# Patient Record
Sex: MALE
[De-identification: names, ages, dates, MRNs, and addresses within clinical notes are randomized per-mention and may not be internally consistent; named-entity substitution may affect disease eponyms.]

---

## 2018-06-28 ENCOUNTER — APPOINTMENT (OUTPATIENT)
Dept: ORTHOPEDIC SURGERY | Facility: CLINIC | Age: 43
End: 2018-06-28
Payer: COMMERCIAL

## 2018-06-28 VITALS — HEIGHT: 69 IN | BODY MASS INDEX: 23.4 KG/M2 | RESPIRATION RATE: 16 BRPM | WEIGHT: 158 LBS

## 2018-06-28 DIAGNOSIS — M79.645 PAIN IN LEFT FINGER(S): ICD-10-CM

## 2018-06-28 DIAGNOSIS — Z78.9 OTHER SPECIFIED HEALTH STATUS: ICD-10-CM

## 2018-06-28 PROBLEM — Z00.00 ENCOUNTER FOR PREVENTIVE HEALTH EXAMINATION: Status: ACTIVE | Noted: 2018-06-28

## 2018-06-28 PROCEDURE — 73140 X-RAY EXAM OF FINGER(S): CPT | Mod: F4

## 2018-06-28 PROCEDURE — 99203 OFFICE O/P NEW LOW 30 MIN: CPT

## 2018-06-28 RX ORDER — DEXTROAMPHETAMINE SACCHARATE, AMPHETAMINE ASPARTATE, DEXTROAMPHETAMINE SULFATE AND AMPHETAMINE SULFATE 5; 5; 5; 5 MG/1; MG/1; MG/1; MG/1
20 TABLET ORAL
Qty: 30 | Refills: 0 | Status: ACTIVE | COMMUNITY
Start: 2018-04-09

## 2018-06-28 RX ORDER — METHYLPREDNISOLONE 4 MG/1
4 TABLET ORAL
Qty: 21 | Refills: 0 | Status: ACTIVE | COMMUNITY
Start: 2018-02-07

## 2018-06-28 RX ORDER — SELENIUM SULFIDE 25 MG/ML
2.5 LOTION TOPICAL
Qty: 120 | Refills: 0 | Status: ACTIVE | COMMUNITY
Start: 2017-12-18

## 2018-06-28 RX ORDER — AVANAFIL 100 MG/1
100 TABLET ORAL
Qty: 6 | Refills: 0 | Status: ACTIVE | COMMUNITY
Start: 2018-05-31

## 2018-06-28 RX ORDER — FINASTERIDE 1 MG/1
1 TABLET ORAL
Qty: 30 | Refills: 0 | Status: ACTIVE | COMMUNITY
Start: 2017-05-24

## 2020-04-29 ENCOUNTER — APPOINTMENT (OUTPATIENT)
Dept: UROLOGY | Facility: CLINIC | Age: 45
End: 2020-04-29
Payer: COMMERCIAL

## 2020-04-29 DIAGNOSIS — N46.9 MALE INFERTILITY, UNSPECIFIED: ICD-10-CM

## 2020-04-29 PROCEDURE — 99214 OFFICE O/P EST MOD 30 MIN: CPT | Mod: 95

## 2020-04-29 RX ORDER — SILDENAFIL 100 MG/1
100 TABLET, FILM COATED ORAL
Qty: 90 | Refills: 1 | Status: ACTIVE | COMMUNITY
Start: 2020-04-29 | End: 1900-01-01

## 2020-04-29 NOTE — LETTER BODY
[Dear  ___] : Dear  [unfilled], [Please see my note below.] : Please see my note below. [FreeTextEntry3] : Best Regards, \par \par Aracelis Mustafa MD\par  [FreeTextEntry1] : I had the pleasure of evaluation of your patient today via a telehealth virtual visit.\par  [Consult Closing:] : Thank you very much for allowing me to participate in the care of this patient.  If you have any questions, please do not hesitate to contact me.

## 2020-04-29 NOTE — HISTORY OF PRESENT ILLNESS
[Home] : at home, [unfilled] , at the time of the visit. [Other Location: e.g. Home (Enter Location, City,State)___] : at [unfilled] [Patient] : the patient [FreeTextEntry1] :  The patient-doctor relationship has been established in a face to face fashion via real time video/audio HIPAA compliant communication using telemedicine software. The patient's identity has been confirmed. The patient was previously emailed a copy of the telemedicine consent. They have had a chance to review and has now given verbal consent and has requested care to be assessed and treated through telemedicine and understands there maybe limitations in this process and they may need further follow up care in the office and or hospital settings. \par Verbal consent given on 4/29/2020, at 10:30 AM, crystal [facility].\par \par This 45 year old male was last seen in 12/12/2018 to follow up on penile trauma and to check for subsequent Peyronie's Disease. He was treated with Vitamine E and subsequently noted improvement in the angle and the fibrosis. At present, he feels that there may be a slight curvature and an indentation at the site of the prior fibrosis on the left side of the shaft with a full erection, but he does not feel a nodule, and does not have pain. The patient can get an erection with Viagra 100 mg (1/4), which he has been taking for ~10 years. He would like switch from his PCP to me as the ordering physician for the sildenafil and get it from [facility], when I offered this.\par \par The patient also tells me that he and his fiance are considering trying to get pregnant over the next 2-3 months. He is on finasteride 1 mg daily for hair and has been for many years. He is concerned over the potential effect that this may have on his fertility potential. He also takes occasional adderal to focus at work.\par \par  The patient denies fevers, chills, nausea and or vomiting and no unexplained weight loss. \par All pertinent parts of the patient PFSH (past medical, family and social histories), laboratory, radiological studies and physician notes were reviewed prior to starting the face to face portion of the telemedicine visit. Questionnaire results were discussed with patient.\par \par \par

## 2020-04-29 NOTE — ASSESSMENT
[FreeTextEntry1] : We had an extensive discussion about the penile symptoms and likelihood that these represent mild, stable P.D. We also discussed the potential that these could increase and the need to be sure that both he and his fiance are well lubricated during sexual activity. We discussed optimal lubricants to maximize comfort and minimize chaffing. I ordered sildenafil 100 mg x 90 x 2 at [facility].\par \par As for the fertility issues, we discussed the potential that finasteride could deleteriously affect his sperm production and/or function. We discussed the need to stop for 32 months to see any beneficial effect, if he choses to stop the medication. We also discussed the option to try while on the medication for 3 months and, if not successful, then obtain a semen analysis. We also discussed getting a SA now, but we will not due to CO-V ID 19 issues. We also discussed limiting EtOH intake to no more than 1-2 drinks / week, and staying away from THC or other recreational drugs. We will plan to meet for an exam at some point in the future. We ended the visual portion of the visit at 10:52 AM. Aracelis Mustafa MD\par

## 2020-12-07 ENCOUNTER — TRANSCRIPTION ENCOUNTER (OUTPATIENT)
Age: 45
End: 2020-12-07

## 2021-04-05 ENCOUNTER — TRANSCRIPTION ENCOUNTER (OUTPATIENT)
Age: 46
End: 2021-04-05

## 2021-10-29 ENCOUNTER — TRANSCRIPTION ENCOUNTER (OUTPATIENT)
Age: 46
End: 2021-10-29

## 2021-11-30 ENCOUNTER — TRANSCRIPTION ENCOUNTER (OUTPATIENT)
Age: 46
End: 2021-11-30

## 2021-12-04 ENCOUNTER — TRANSCRIPTION ENCOUNTER (OUTPATIENT)
Age: 46
End: 2021-12-04

## 2022-01-10 ENCOUNTER — TRANSCRIPTION ENCOUNTER (OUTPATIENT)
Age: 47
End: 2022-01-10

## 2022-01-15 ENCOUNTER — TRANSCRIPTION ENCOUNTER (OUTPATIENT)
Age: 47
End: 2022-01-15

## 2022-02-17 ENCOUNTER — TRANSCRIPTION ENCOUNTER (OUTPATIENT)
Age: 47
End: 2022-02-17

## 2022-06-22 ENCOUNTER — NON-APPOINTMENT (OUTPATIENT)
Age: 47
End: 2022-06-22

## 2022-07-25 ENCOUNTER — NON-APPOINTMENT (OUTPATIENT)
Age: 47
End: 2022-07-25

## 2022-09-19 ENCOUNTER — NON-APPOINTMENT (OUTPATIENT)
Age: 47
End: 2022-09-19

## 2022-11-16 ENCOUNTER — NON-APPOINTMENT (OUTPATIENT)
Age: 47
End: 2022-11-16

## 2023-07-26 ENCOUNTER — APPOINTMENT (OUTPATIENT)
Dept: UROLOGY | Facility: CLINIC | Age: 48
End: 2023-07-26
Payer: COMMERCIAL

## 2023-07-26 VITALS
HEART RATE: 53 BPM | WEIGHT: 165 LBS | BODY MASS INDEX: 24.44 KG/M2 | OXYGEN SATURATION: 96 % | SYSTOLIC BLOOD PRESSURE: 130 MMHG | HEIGHT: 69 IN | TEMPERATURE: 98 F | DIASTOLIC BLOOD PRESSURE: 84 MMHG

## 2023-07-26 LAB
BILIRUB UR QL STRIP: NORMAL
CLARITY UR: CLEAR
COLLECTION METHOD: NORMAL
GLUCOSE UR-MCNC: NORMAL
HCG UR QL: 0.2 EU/DL
HGB UR QL STRIP.AUTO: NORMAL
KETONES UR-MCNC: NORMAL
LEUKOCYTE ESTERASE UR QL STRIP: NORMAL
NITRITE UR QL STRIP: NORMAL
PH UR STRIP: 6.5
PROT UR STRIP-MCNC: NORMAL
SP GR UR STRIP: 1.01

## 2023-07-26 PROCEDURE — 81003 URINALYSIS AUTO W/O SCOPE: CPT | Mod: NC,QW

## 2023-07-26 PROCEDURE — 99204 OFFICE O/P NEW MOD 45 MIN: CPT

## 2023-07-26 RX ORDER — TADALAFIL 5 MG/1
5 TABLET ORAL
Qty: 90 | Refills: 0 | Status: ACTIVE | COMMUNITY
Start: 2023-07-26 | End: 1900-01-01

## 2023-07-26 NOTE — PHYSICAL EXAM
[General Appearance - Well Developed] : well developed [General Appearance - Well Nourished] : well nourished [Normal Appearance] : normal appearance [Well Groomed] : well groomed [General Appearance - In No Acute Distress] : no acute distress [Edema] : no peripheral edema [Respiration, Rhythm And Depth] : normal respiratory rhythm and effort [Exaggerated Use Of Accessory Muscles For Inspiration] : no accessory muscle use [Abdomen Soft] : soft [Abdomen Tenderness] : non-tender [Abdomen Hernia] : no hernia was discovered [Costovertebral Angle Tenderness] : no ~M costovertebral angle tenderness [Urethral Meatus] : meatus normal [Scrotum] : the scrotum was normal [Epididymis] : the epididymides were normal [Testes Tenderness] : no tenderness of the testes [Testes Mass (___cm)] : there were no testicular masses [FreeTextEntry1] : 5 mm dorsal soft nodule 7 cm proximal to corona, 4 cm distal to penile base with a soft spot in the tunica just distal to it, which corresponds to the area of angulation. No angle in flaccid penis. [Normal Station and Gait] : the gait and station were normal for the patient's age [] : no rash [No Focal Deficits] : no focal deficits [Oriented To Time, Place, And Person] : oriented to person, place, and time [Affect] : the affect was normal [Mood] : the mood was normal [Not Anxious] : not anxious

## 2023-07-26 NOTE — HISTORY OF PRESENT ILLNESS
[FreeTextEntry1] : 47 YO M seen TODAY 7/26/2023 as NPT. He was  seen in 12/12/2018 to follow up on penile trauma and to check for subsequent Peyronie's Disease. He was treated with Vitamine E and subsequently noted improvement in the angle and the fibrosis.\par \par Patient seen 4/29/2020 via [facility].  At that time he felt that there may be a slight curvature and an indentation at the site of the prior fibrosis on the left side of the shaft with a full erection, but he does not feel a nodule, and does not have pain. The patient can get an erection with Viagra 100 mg (1/4), which he has been taking for ~10 years. He would like switch from his PCP to me as the ordering physician for the sildenafil and get it from [facility], when I offered this.\par The patient also tells me that he and his fiance are considering trying to get pregnant over the next 2-3 months. He is on finasteride 1 mg daily for hair and has been for many years. He is concerned over the potential effect that this may have on his fertility potential. He also takes occasional adderal to focus at work.\par  The patient denies fevers, chills, nausea and or vomiting and no unexplained weight loss. \par All pertinent parts of the patient PFSH (past medical, family and social histories), laboratory, radiological studies and physician notes were reviewed prior to starting the face to face portion of the telemedicine visit. Questionnaire results were discussed with patient.\par We had an extensive discussion about the penile symptoms and likelihood that these represent mild, stable P.D. We also discussed the potential that these could increase and the need to be sure that both he and his fiance are well lubricated during sexual activity. We discussed optimal lubricants to maximize comfort and minimize chaffing. I ordered sildenafil 100 mg x 90 x 2 at [facility].\par As for the fertility issues, we discussed the potential that finasteride could deleteriously affect his sperm production and/or function. We discussed the need to stop for 32 months to see any beneficial effect, if he choses to stop the medication. We also discussed the option to try while on the medication for 3 months and, if not successful, then obtain a semen analysis. We also discussed getting a SA now, but we will not due to CO-V ID 19 issues. We also discussed limiting EtOH intake to no more than 1-2 drinks / week, and staying away from THC or other recreational drugs. We will plan to meet for an exam at some point in the future.\par \par TODAY 7/26/2023, patient told me that he was doing well with no penile issues and normal erectile function. His wife gave birth to a son 2.5 years ago and is now pregnant with his second son. He has had infrequent sex lately due to the pregnancy. He noted an angulation of the penis to the left with a weak area on that side about 6 weeks ago. He believes the angle is less than 15 degrees and does not affect his ability to achieve or maintain an erection, or to penetrate. He notes some "sensitivity" in this area with an erection, but no real pain. He continues to  use Sildenafil 100 mg , 1/4 tab PRN, but states that he can get and keep an erection without it.\par No LUTS negative\par IPSS 2\par He also takes finasteride and Adderall, NKMA.\par No FH of prostate cancer known.\par

## 2023-07-26 NOTE — LETTER BODY
[Dear  ___] : Dear  [unfilled], [Courtesy Letter:] : I had the pleasure of seeing your patient, [unfilled], in my office today. [Please see my note below.] : Please see my note below. [Consult Closing:] : Thank you very much for allowing me to participate in the care of this patient.  If you have any questions, please do not hesitate to contact me. [FreeTextEntry3] : Best Regards, \par \par Aracelis Mustafa MD\par

## 2023-07-26 NOTE — ASSESSMENT
[FreeTextEntry1] : We discussed in detail today patient's findings which could be early Peyronie's disease.  We discussed the fact that at the present time he is still able to achieve and maintain his erection and have satisfactory penetration and would not really be a candidate for any injection therapy such as Xiaflex.  I reviewed with the patient alternatives moving forward including continued observation alone and reassessment in 3 months versus initiation of therapy with daily tadalafil in an effort to improve blood flow to the area and possibly effect healing and remodeling of the area.  I reviewed the indications risk alternatives and chances for success with this off label usage of the medicine we discussed the potential improvement in his erections as a side effect.  He would like to try the medicine but is concerned about the dosage.  I will order the medicine as 5 mg daily but I instructed the patient to cut the pills in half and take 1/2 pill (2.5) daily.  I recommended that we reassess in 3 months if he remains stable or improves and that he contact me sooner if he notices any worsening of the curvature or the pain or if he notices any degeneration in his ability to achieve and maintain his erections. Aracelis Mustafa MD\par

## 2023-08-23 ENCOUNTER — APPOINTMENT (OUTPATIENT)
Dept: UROLOGY | Facility: CLINIC | Age: 48
End: 2023-08-23

## 2023-10-25 ENCOUNTER — APPOINTMENT (OUTPATIENT)
Dept: UROLOGY | Facility: CLINIC | Age: 48
End: 2023-10-25

## 2023-10-31 ENCOUNTER — NON-APPOINTMENT (OUTPATIENT)
Age: 48
End: 2023-10-31

## 2023-12-22 ENCOUNTER — APPOINTMENT (OUTPATIENT)
Dept: UROLOGY | Facility: CLINIC | Age: 48
End: 2023-12-22
Payer: COMMERCIAL

## 2023-12-22 DIAGNOSIS — N52.01 ERECTILE DYSFUNCTION DUE TO ARTERIAL INSUFFICIENCY: ICD-10-CM

## 2023-12-22 DIAGNOSIS — N48.6 INDURATION PENIS PLASTICA: ICD-10-CM

## 2023-12-22 PROCEDURE — 81003 URINALYSIS AUTO W/O SCOPE: CPT | Mod: QW

## 2023-12-22 PROCEDURE — 99214 OFFICE O/P EST MOD 30 MIN: CPT

## 2023-12-22 NOTE — LETTER BODY
[Dear  ___] : Dear  [unfilled], [Courtesy Letter:] : I had the pleasure of seeing your patient, [unfilled], in my office today. [Please see my note below.] : Please see my note below. [Consult Closing:] : Thank you very much for allowing me to participate in the care of this patient.  If you have any questions, please do not hesitate to contact me. [FreeTextEntry3] : Best Regards,   Aracelis Mustafa MD

## 2023-12-22 NOTE — ASSESSMENT
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  I recommend that he continue on this present regimen, and that we follow-up in another 6 months.  He will call me if he requires more tadalafil before that visit.  As for his concerns over the possibility of jasmine testicular cancer, I reviewed with him the normal range in which men are flaccid with this cancer I also reviewed with him techniques and self testicular examination if he should desire to do this on a monthly basis.  Is evaluated today by me is perfectly normal.  As for his interest in having a vasectomy for voluntary sterilization, I reviewed with him the indications for risks alternatives and chances for success with this procedure I also reviewed with him the no scalpel technique which I used in the office.  If he decides to pursue this further we will again address these issues.  Otherwise, follow-up as planned for the Peyronie's disease in 6 months. Aracelis Mustafa MD

## 2023-12-22 NOTE — PHYSICAL EXAM
[Normal Appearance] : normal appearance [Well Groomed] : well groomed [General Appearance - In No Acute Distress] : no acute distress [Edema] : no peripheral edema [Respiration, Rhythm And Depth] : normal respiratory rhythm and effort [Exaggerated Use Of Accessory Muscles For Inspiration] : no accessory muscle use [Abdomen Soft] : soft [Abdomen Tenderness] : non-tender [Costovertebral Angle Tenderness] : no ~M costovertebral angle tenderness [Urethral Meatus] : meatus normal [Penis Abnormality] : normal circumcised penis [Urinary Bladder Findings] : the bladder was normal on palpation [Epididymis] : the epididymides were normal [Testes Tenderness] : no tenderness of the testes [Testes Mass (___cm)] : there were no testicular masses [Normal Station and Gait] : the gait and station were normal for the patient's age [] : no rash [No Focal Deficits] : no focal deficits [Oriented To Time, Place, And Person] : oriented to person, place, and time [Affect] : the affect was normal [Mood] : the mood was normal [Not Anxious] : not anxious [de-identified] : Prior soft plaque unchanged in position, size or texture.

## 2023-12-22 NOTE — HISTORY OF PRESENT ILLNESS
[FreeTextEntry1] : 49 YO M seen 7/26/2023 as NPT. He was  seen in 12/12/2018 to follow up on penile trauma and to check for subsequent Peyronie's Disease. He was treated with Vitamine E and subsequently noted improvement in the angle and the fibrosis.  Patient seen 4/29/2020 via [facility].  At that time he felt that there may be a slight curvature and an indentation at the site of the prior fibrosis on the left side of the shaft with a full erection, but he does not feel a nodule, and does not have pain. The patient can get an erection with Viagra 100 mg (1/4), which he has been taking for ~10 years. He would like switch from his PCP to me as the ordering physician for the sildenafil and get it from [facility], when I offered this. The patient also tells me that he and his fiance are considering trying to get pregnant over the next 2-3 months. He is on finasteride 1 mg daily for hair and has been for many years. He is concerned over the potential effect that this may have on his fertility potential. He also takes occasional adderal to focus at work.  The patient denies fevers, chills, nausea and or vomiting and no unexplained weight loss.  All pertinent parts of the patient PFSH (past medical, family and social histories), laboratory, radiological studies and physician notes were reviewed prior to starting the face to face portion of the telemedicine visit. Questionnaire results were discussed with patient. We had an extensive discussion about the penile symptoms and likelihood that these represent mild, stable P.D. We also discussed the potential that these could increase and the need to be sure that both he and his fiance are well lubricated during sexual activity. We discussed optimal lubricants to maximize comfort and minimize chaffing. I ordered sildenafil 100 mg x 90 x 2 at [facility]. As for the fertility issues, we discussed the potential that finasteride could deleteriously affect his sperm production and/or function. We discussed the need to stop for 32 months to see any beneficial effect, if he choses to stop the medication. We also discussed the option to try while on the medication for 3 months and, if not successful, then obtain a semen analysis. We also discussed getting a SA now, but we will not due to CO-V ID 19 issues. We also discussed limiting EtOH intake to no more than 1-2 drinks / week, and staying away from THC or other recreational drugs. We will plan to meet for an exam at some point in the future.  Patient seen  as NPT 7/26/2023, patient told me that he was doing well with no penile issues and normal erectile function. His wife gave birth to a son 2.5 years ago and is now pregnant with his second son. He has had infrequent sex lately due to the pregnancy. He noted an angulation of the penis to the left with a weak area on that side about 6 weeks ago. He believes the angle is less than 15 degrees and does not affect his ability to achieve or maintain an erection, or to penetrate. He notes some "sensitivity" in this area with an erection, but no real pain. He continues to  use Sildenafil 100 mg , 1/4 tab PRN, but states that he can get and keep an erection without it. No LUTS negative IPSS 2 He also takes finasteride and Adderall, NKMA. No FH of prostate cancer known. We discussed in detail today patient's findings which could be early Peyronie's disease. We discussed the fact that at the present time he is still able to achieve and maintain his erection and have satisfactory penetration and would not really be a candidate for any injection therapy such as Xiaflex. I reviewed with the patient alternatives moving forward including continued observation alone and reassessment in 3 months versus initiation of therapy with daily tadalafil in an effort to improve blood flow to the area and possibly effect healing and remodeling of the area. I reviewed the indications risk alternatives and chances for success with this off label usage of the medicine we discussed the potential improvement in his erections as a side effect. He would like to try the medicine but is concerned about the dosage. I will order the medicine as 5 mg daily but I instructed the patient to cut the pills in half and take 1/2 pill (2.5) daily. I recommended that we reassess in 3 months if he remains stable or improves and that he contact me sooner if he notices any worsening of the curvature or the pain or if he notices any degeneration in his ability to achieve and maintain his erections.  Patient seen TODAY 12/22/2023 to reassess. He told me that he feels that the tadalafil 2.5 mg is helping and his erections are full. He has enough at hand and will call for more as needed.  He is able to have satisfactory penetrative sexual activity. He notes a persistent 10 degree angulation to the left. He has at times noted some feelings of constriction in the skin of the penis when erect and during intercourse.  Patient also asks about testis cancer since his best friend age 48 just underwent orchiectomy for it. He is also interested in possible getting a vasectomy. UA negative GOLDY 24

## 2023-12-27 LAB
BILIRUB UR QL STRIP: NORMAL
CLARITY UR: CLEAR
COLLECTION METHOD: NORMAL
GLUCOSE UR-MCNC: NORMAL
HCG UR QL: 0.2 EU/DL
HGB UR QL STRIP.AUTO: NORMAL
KETONES UR-MCNC: NORMAL
LEUKOCYTE ESTERASE UR QL STRIP: NORMAL
NITRITE UR QL STRIP: NORMAL
PH UR STRIP: 5.5
PROT UR STRIP-MCNC: NORMAL
SP GR UR STRIP: 1.01

## 2024-03-15 RX ORDER — TADALAFIL 5 MG/1
5 TABLET ORAL
Qty: 90 | Refills: 2 | Status: ACTIVE | COMMUNITY
Start: 2024-03-15 | End: 1900-01-01

## 2024-06-13 ENCOUNTER — APPOINTMENT (OUTPATIENT)
Dept: UROLOGY | Facility: CLINIC | Age: 49
End: 2024-06-13

## 2025-06-03 ENCOUNTER — NON-APPOINTMENT (OUTPATIENT)
Age: 50
End: 2025-06-03

## 2025-06-11 ENCOUNTER — NON-APPOINTMENT (OUTPATIENT)
Age: 50
End: 2025-06-11

## 2025-06-11 ENCOUNTER — APPOINTMENT (OUTPATIENT)
Dept: UROLOGY | Facility: CLINIC | Age: 50
End: 2025-06-11
Payer: COMMERCIAL

## 2025-06-11 VITALS
BODY MASS INDEX: 24.44 KG/M2 | DIASTOLIC BLOOD PRESSURE: 65 MMHG | TEMPERATURE: 97.9 F | SYSTOLIC BLOOD PRESSURE: 111 MMHG | HEIGHT: 69 IN | HEART RATE: 52 BPM | WEIGHT: 165 LBS

## 2025-06-11 PROCEDURE — 99214 OFFICE O/P EST MOD 30 MIN: CPT
